# Patient Record
Sex: MALE | Race: WHITE | NOT HISPANIC OR LATINO
[De-identification: names, ages, dates, MRNs, and addresses within clinical notes are randomized per-mention and may not be internally consistent; named-entity substitution may affect disease eponyms.]

---

## 2018-05-08 PROBLEM — Z00.00 ENCOUNTER FOR PREVENTIVE HEALTH EXAMINATION: Status: ACTIVE | Noted: 2018-05-08

## 2023-01-20 ENCOUNTER — NON-APPOINTMENT (OUTPATIENT)
Age: 38
End: 2023-01-20

## 2023-08-02 ENCOUNTER — NON-APPOINTMENT (OUTPATIENT)
Age: 38
End: 2023-08-02

## 2023-08-10 ENCOUNTER — NON-APPOINTMENT (OUTPATIENT)
Age: 38
End: 2023-08-10

## 2023-08-11 ENCOUNTER — INPATIENT (INPATIENT)
Facility: HOSPITAL | Age: 38
LOS: 2 days | Discharge: HOME | DRG: 193 | End: 2023-08-14
Attending: STUDENT IN AN ORGANIZED HEALTH CARE EDUCATION/TRAINING PROGRAM | Admitting: HOSPITALIST
Payer: SELF-PAY

## 2023-08-11 VITALS
DIASTOLIC BLOOD PRESSURE: 87 MMHG | TEMPERATURE: 100 F | OXYGEN SATURATION: 90 % | WEIGHT: 220.02 LBS | HEIGHT: 71 IN | SYSTOLIC BLOOD PRESSURE: 141 MMHG | RESPIRATION RATE: 20 BRPM | HEART RATE: 127 BPM

## 2023-08-11 PROCEDURE — 71046 X-RAY EXAM CHEST 2 VIEWS: CPT | Mod: 26

## 2023-08-11 PROCEDURE — 99285 EMERGENCY DEPT VISIT HI MDM: CPT

## 2023-08-11 RX ORDER — SODIUM CHLORIDE 9 MG/ML
3 INJECTION INTRAMUSCULAR; INTRAVENOUS; SUBCUTANEOUS ONCE
Refills: 0 | Status: COMPLETED | OUTPATIENT
Start: 2023-08-11 | End: 2023-08-11

## 2023-08-11 RX ORDER — CEFTRIAXONE 500 MG/1
1000 INJECTION, POWDER, FOR SOLUTION INTRAMUSCULAR; INTRAVENOUS ONCE
Refills: 0 | Status: COMPLETED | OUTPATIENT
Start: 2023-08-11 | End: 2023-08-11

## 2023-08-11 RX ORDER — CEFTRIAXONE 500 MG/1
1000 INJECTION, POWDER, FOR SOLUTION INTRAMUSCULAR; INTRAVENOUS ONCE
Refills: 0 | Status: DISCONTINUED | OUTPATIENT
Start: 2023-08-11 | End: 2023-08-11

## 2023-08-11 RX ORDER — SODIUM CHLORIDE 9 MG/ML
1000 INJECTION INTRAMUSCULAR; INTRAVENOUS; SUBCUTANEOUS ONCE
Refills: 0 | Status: COMPLETED | OUTPATIENT
Start: 2023-08-11 | End: 2023-08-11

## 2023-08-11 RX ORDER — KETOROLAC TROMETHAMINE 30 MG/ML
30 SYRINGE (ML) INJECTION ONCE
Refills: 0 | Status: DISCONTINUED | OUTPATIENT
Start: 2023-08-11 | End: 2023-08-11

## 2023-08-11 RX ORDER — IPRATROPIUM/ALBUTEROL SULFATE 18-103MCG
3 AEROSOL WITH ADAPTER (GRAM) INHALATION ONCE
Refills: 0 | Status: COMPLETED | OUTPATIENT
Start: 2023-08-11 | End: 2023-08-11

## 2023-08-11 RX ORDER — AZITHROMYCIN 500 MG/1
500 TABLET, FILM COATED ORAL ONCE
Refills: 0 | Status: COMPLETED | OUTPATIENT
Start: 2023-08-11 | End: 2023-08-11

## 2023-08-11 RX ORDER — ACETAMINOPHEN 500 MG
975 TABLET ORAL ONCE
Refills: 0 | Status: COMPLETED | OUTPATIENT
Start: 2023-08-11 | End: 2023-08-12

## 2023-08-11 RX ORDER — ALBUTEROL 90 UG/1
2.5 AEROSOL, METERED ORAL ONCE
Refills: 0 | Status: COMPLETED | OUTPATIENT
Start: 2023-08-11 | End: 2023-08-11

## 2023-08-11 NOTE — ED ADULT TRIAGE NOTE - CHIEF COMPLAINT QUOTE
pt c/o not feeling well, was on Amoxicillin & prednisone, getting worse  A&Ox3, resp wnl, sent for r/o PNA, oxygen sat was 88%

## 2023-08-11 NOTE — ED PROVIDER NOTE - PROGRESS NOTE DETAILS
Pt remains hypoxic with R/A sat 89% despite nebs.  RVP as noted.  Case d/w Hospitalist and will admit for IV abx and supplemental oxygen

## 2023-08-11 NOTE — ED PROVIDER NOTE - CLINICAL SUMMARY MEDICAL DECISION MAKING FREE TEXT BOX
39 yo M presents to ED referred to ED from  for further evacuation of hypoxia with R/A O2 sat 88% and tachycardia to  135.  Pt states he was treated for bronchitis 1 week ago with Amoxicillin and po steroids, but never improved and now with worsening cough and fatigue.  pt denies any tob use or vaping, but does smoke marijuana.  will check labs, CXR, start IV Abx, nebs and re-eval 37 yo M presents to ED referred to ED from  for further evacuation of hypoxia with R/A O2 sat 88% and tachycardia to  135.  Pt states he was treated for bronchitis 1 week ago with Amoxicillin and po steroids, but never improved and now with worsening cough and fatigue.  pt denies any tob use or vaping, but does smoke marijuana.  will check labs, CXR, start IV Abx, nebs and re-eval

## 2023-08-11 NOTE — ED PROVIDER NOTE - OBJECTIVE STATEMENT
37 yo M presents to ED referred to ED from  for further evacuation of hypoxia with R/A O2 sat 88% and tachycardia to  135.  Pt states he was treated for bronchitis 1 week ago with Amoxicillin and po steroids, but never improved and now with worsening cough and fatigue.  pt denies any tob use or vaping, but does smoke marijuana

## 2023-08-12 DIAGNOSIS — J15.7 PNEUMONIA DUE TO MYCOPLASMA PNEUMONIAE: ICD-10-CM

## 2023-08-12 DIAGNOSIS — Z78.9 OTHER SPECIFIED HEALTH STATUS: Chronic | ICD-10-CM

## 2023-08-12 LAB
ALBUMIN SERPL ELPH-MCNC: 3.4 G/DL — SIGNIFICANT CHANGE UP (ref 3.3–5.2)
ALP SERPL-CCNC: 48 U/L — SIGNIFICANT CHANGE UP (ref 40–120)
ALT FLD-CCNC: 56 U/L — HIGH
ANION GAP SERPL CALC-SCNC: 15 MMOL/L — SIGNIFICANT CHANGE UP (ref 5–17)
ANION GAP SERPL CALC-SCNC: 17 MMOL/L — SIGNIFICANT CHANGE UP (ref 5–17)
AST SERPL-CCNC: 44 U/L — HIGH
B PERT DNA SPEC QL NAA+PROBE: DETECTED
BASE EXCESS BLDV CALC-SCNC: 1 MMOL/L — SIGNIFICANT CHANGE UP (ref -2–3)
BASOPHILS # BLD AUTO: 0.1 K/UL — SIGNIFICANT CHANGE UP (ref 0–0.2)
BASOPHILS NFR BLD AUTO: 0.8 % — SIGNIFICANT CHANGE UP (ref 0–2)
BILIRUB SERPL-MCNC: 0.4 MG/DL — SIGNIFICANT CHANGE UP (ref 0.4–2)
BUN SERPL-MCNC: 5.5 MG/DL — LOW (ref 8–20)
BUN SERPL-MCNC: 5.9 MG/DL — LOW (ref 8–20)
CA-I SERPL-SCNC: 1.03 MMOL/L — LOW (ref 1.15–1.33)
CALCIUM SERPL-MCNC: 7.6 MG/DL — LOW (ref 8.4–10.5)
CALCIUM SERPL-MCNC: 8.6 MG/DL — SIGNIFICANT CHANGE UP (ref 8.4–10.5)
CHLORIDE BLDV-SCNC: 96 MMOL/L — SIGNIFICANT CHANGE UP (ref 96–108)
CHLORIDE SERPL-SCNC: 101 MMOL/L — SIGNIFICANT CHANGE UP (ref 96–108)
CHLORIDE SERPL-SCNC: 91 MMOL/L — LOW (ref 96–108)
CO2 SERPL-SCNC: 21 MMOL/L — LOW (ref 22–29)
CO2 SERPL-SCNC: 23 MMOL/L — SIGNIFICANT CHANGE UP (ref 22–29)
CREAT SERPL-MCNC: 0.68 MG/DL — SIGNIFICANT CHANGE UP (ref 0.5–1.3)
CREAT SERPL-MCNC: 0.72 MG/DL — SIGNIFICANT CHANGE UP (ref 0.5–1.3)
EGFR: 120 ML/MIN/1.73M2 — SIGNIFICANT CHANGE UP
EGFR: 122 ML/MIN/1.73M2 — SIGNIFICANT CHANGE UP
EOSINOPHIL # BLD AUTO: 0.12 K/UL — SIGNIFICANT CHANGE UP (ref 0–0.5)
EOSINOPHIL NFR BLD AUTO: 1 % — SIGNIFICANT CHANGE UP (ref 0–6)
GAS PNL BLDV: 132 MMOL/L — LOW (ref 136–145)
GAS PNL BLDV: SIGNIFICANT CHANGE UP
GLUCOSE BLDV-MCNC: 113 MG/DL — HIGH (ref 70–99)
GLUCOSE SERPL-MCNC: 105 MG/DL — HIGH (ref 70–99)
GLUCOSE SERPL-MCNC: 115 MG/DL — HIGH (ref 70–99)
HCO3 BLDV-SCNC: 25 MMOL/L — SIGNIFICANT CHANGE UP (ref 22–29)
HCT VFR BLD CALC: 38.4 % — LOW (ref 39–50)
HCT VFR BLD CALC: 42 % — SIGNIFICANT CHANGE UP (ref 39–50)
HCT VFR BLDA CALC: 47 % — SIGNIFICANT CHANGE UP
HGB BLD CALC-MCNC: 15.6 G/DL — SIGNIFICANT CHANGE UP (ref 12.6–17.4)
HGB BLD-MCNC: 13.4 G/DL — SIGNIFICANT CHANGE UP (ref 13–17)
HGB BLD-MCNC: 15.1 G/DL — SIGNIFICANT CHANGE UP (ref 13–17)
IMM GRANULOCYTES NFR BLD AUTO: 2 % — HIGH (ref 0–0.9)
LACTATE BLDV-MCNC: 2.5 MMOL/L — HIGH (ref 0.5–2)
LYMPHOCYTES # BLD AUTO: 1.88 K/UL — SIGNIFICANT CHANGE UP (ref 1–3.3)
LYMPHOCYTES # BLD AUTO: 15 % — SIGNIFICANT CHANGE UP (ref 13–44)
MAGNESIUM SERPL-MCNC: 1.9 MG/DL — SIGNIFICANT CHANGE UP (ref 1.6–2.6)
MCHC RBC-ENTMCNC: 31.9 PG — SIGNIFICANT CHANGE UP (ref 27–34)
MCHC RBC-ENTMCNC: 32.5 PG — SIGNIFICANT CHANGE UP (ref 27–34)
MCHC RBC-ENTMCNC: 34.9 GM/DL — SIGNIFICANT CHANGE UP (ref 32–36)
MCHC RBC-ENTMCNC: 36 GM/DL — SIGNIFICANT CHANGE UP (ref 32–36)
MCV RBC AUTO: 90.3 FL — SIGNIFICANT CHANGE UP (ref 80–100)
MCV RBC AUTO: 91.4 FL — SIGNIFICANT CHANGE UP (ref 80–100)
MONOCYTES # BLD AUTO: 1.3 K/UL — HIGH (ref 0–0.9)
MONOCYTES NFR BLD AUTO: 10.4 % — SIGNIFICANT CHANGE UP (ref 2–14)
NEUTROPHILS # BLD AUTO: 8.85 K/UL — HIGH (ref 1.8–7.4)
NEUTROPHILS NFR BLD AUTO: 70.8 % — SIGNIFICANT CHANGE UP (ref 43–77)
PCO2 BLDV: 36 MMHG — LOW (ref 42–55)
PH BLDV: 7.45 — HIGH (ref 7.32–7.43)
PLATELET # BLD AUTO: 339 K/UL — SIGNIFICANT CHANGE UP (ref 150–400)
PLATELET # BLD AUTO: 393 K/UL — SIGNIFICANT CHANGE UP (ref 150–400)
PO2 BLDV: 71 MMHG — HIGH (ref 25–45)
POTASSIUM BLDV-SCNC: 3.4 MMOL/L — LOW (ref 3.5–5.1)
POTASSIUM SERPL-MCNC: 3.6 MMOL/L — SIGNIFICANT CHANGE UP (ref 3.5–5.3)
POTASSIUM SERPL-SCNC: 3.6 MMOL/L — SIGNIFICANT CHANGE UP (ref 3.5–5.3)
PROT SERPL-MCNC: 7.3 G/DL — SIGNIFICANT CHANGE UP (ref 6.6–8.7)
RAPID RVP RESULT: DETECTED
RBC # BLD: 4.2 M/UL — SIGNIFICANT CHANGE UP (ref 4.2–5.8)
RBC # BLD: 4.65 M/UL — SIGNIFICANT CHANGE UP (ref 4.2–5.8)
RBC # FLD: 12.1 % — SIGNIFICANT CHANGE UP (ref 10.3–14.5)
SAO2 % BLDV: 96.5 % — SIGNIFICANT CHANGE UP
SARS-COV-2 RNA SPEC QL NAA+PROBE: SIGNIFICANT CHANGE UP
SODIUM SERPL-SCNC: 131 MMOL/L — LOW (ref 135–145)
SODIUM SERPL-SCNC: 137 MMOL/L — SIGNIFICANT CHANGE UP (ref 135–145)
WBC # BLD: 12.5 K/UL — HIGH (ref 3.8–10.5)
WBC # BLD: 13.09 K/UL — HIGH (ref 3.8–10.5)
WBC # FLD AUTO: 12.5 K/UL — HIGH (ref 3.8–10.5)
WBC # FLD AUTO: 13.09 K/UL — HIGH (ref 3.8–10.5)

## 2023-08-12 PROCEDURE — 99223 1ST HOSP IP/OBS HIGH 75: CPT | Mod: GC

## 2023-08-12 RX ORDER — CEFTRIAXONE 500 MG/1
1000 INJECTION, POWDER, FOR SOLUTION INTRAMUSCULAR; INTRAVENOUS EVERY 24 HOURS
Refills: 0 | Status: DISCONTINUED | OUTPATIENT
Start: 2023-08-12 | End: 2023-08-14

## 2023-08-12 RX ORDER — AZITHROMYCIN 500 MG/1
500 TABLET, FILM COATED ORAL EVERY 24 HOURS
Refills: 0 | Status: DISCONTINUED | OUTPATIENT
Start: 2023-08-12 | End: 2023-08-14

## 2023-08-12 RX ORDER — LANOLIN ALCOHOL/MO/W.PET/CERES
3 CREAM (GRAM) TOPICAL AT BEDTIME
Refills: 0 | Status: DISCONTINUED | OUTPATIENT
Start: 2023-08-12 | End: 2023-08-14

## 2023-08-12 RX ORDER — THIAMINE MONONITRATE (VIT B1) 100 MG
100 TABLET ORAL DAILY
Refills: 0 | Status: COMPLETED | OUTPATIENT
Start: 2023-08-12 | End: 2023-08-14

## 2023-08-12 RX ORDER — SODIUM CHLORIDE 9 MG/ML
2000 INJECTION INTRAMUSCULAR; INTRAVENOUS; SUBCUTANEOUS ONCE
Refills: 0 | Status: COMPLETED | OUTPATIENT
Start: 2023-08-12 | End: 2023-08-12

## 2023-08-12 RX ORDER — ACETAMINOPHEN 500 MG
650 TABLET ORAL EVERY 6 HOURS
Refills: 0 | Status: DISCONTINUED | OUTPATIENT
Start: 2023-08-12 | End: 2023-08-14

## 2023-08-12 RX ORDER — FOLIC ACID 0.8 MG
1 TABLET ORAL DAILY
Refills: 0 | Status: DISCONTINUED | OUTPATIENT
Start: 2023-08-12 | End: 2023-08-14

## 2023-08-12 RX ORDER — ONDANSETRON 8 MG/1
4 TABLET, FILM COATED ORAL EVERY 8 HOURS
Refills: 0 | Status: DISCONTINUED | OUTPATIENT
Start: 2023-08-12 | End: 2023-08-14

## 2023-08-12 RX ADMIN — SODIUM CHLORIDE 3 MILLILITER(S): 9 INJECTION INTRAMUSCULAR; INTRAVENOUS; SUBCUTANEOUS at 00:14

## 2023-08-12 RX ADMIN — Medication 975 MILLIGRAM(S): at 03:12

## 2023-08-12 RX ADMIN — Medication 3 MILLILITER(S): at 00:07

## 2023-08-12 RX ADMIN — Medication 1 MILLIGRAM(S): at 09:53

## 2023-08-12 RX ADMIN — AZITHROMYCIN 255 MILLIGRAM(S): 500 TABLET, FILM COATED ORAL at 00:06

## 2023-08-12 RX ADMIN — AZITHROMYCIN 255 MILLIGRAM(S): 500 TABLET, FILM COATED ORAL at 05:43

## 2023-08-12 RX ADMIN — CEFTRIAXONE 1000 MILLIGRAM(S): 500 INJECTION, POWDER, FOR SOLUTION INTRAMUSCULAR; INTRAVENOUS at 05:43

## 2023-08-12 RX ADMIN — Medication 100 MILLIGRAM(S): at 09:54

## 2023-08-12 RX ADMIN — ALBUTEROL 2.5 MILLIGRAM(S): 90 AEROSOL, METERED ORAL at 00:07

## 2023-08-12 RX ADMIN — SODIUM CHLORIDE 1000 MILLILITER(S): 9 INJECTION INTRAMUSCULAR; INTRAVENOUS; SUBCUTANEOUS at 02:34

## 2023-08-12 RX ADMIN — Medication 1 TABLET(S): at 09:54

## 2023-08-12 RX ADMIN — CEFTRIAXONE 1000 MILLIGRAM(S): 500 INJECTION, POWDER, FOR SOLUTION INTRAMUSCULAR; INTRAVENOUS at 00:06

## 2023-08-12 RX ADMIN — SODIUM CHLORIDE 1000 MILLILITER(S): 9 INJECTION INTRAMUSCULAR; INTRAVENOUS; SUBCUTANEOUS at 00:14

## 2023-08-12 NOTE — H&P ADULT - ASSESSMENT
38M no PMHx presents to the ED after being referred from urgent care for evaluation of hypoxia. Also had cough, chest congestion, and SOB Completed a course of augmentin and steroids however symtoms have persisted. CXR shows b/l hilar opacities     Community Acquired Pneumonia  - Admit to   - RVP shows Mycoplasma  - Start Azithromycin 500 mg IV daily  - Start Ceftriaxone 1g IV daily  - Trend WBC  - Tylenol PRN for fever  - F/u Bcx    VTE ppx:  38M no PMHx presents to the ED after being referred from urgent care for evaluation of hypoxia. Also had cough, chest congestion, and SOB Completed a course of augmentin and steroids however symtoms have persisted. CXR shows b/l hilar opacities     Community Acquired Pneumonia  - Admit to   - RVP shows Mycoplasma  - Start Azithromycin 500 mg IV daily  - Start Ceftriaxone 1g IV daily  - Trend WBC  - Tylenol PRN for fever  - F/u Bcx    Alcohol Use  - CIWA protocol  -  for alcohol cessation  - Ativan if CIWA >8    VTE ppx: Ambulate as tolerated 38M no PMHx presents to the ED after being referred from urgent care for evaluation of hypoxia. Also had cough, chest congestion, and SOB Completed a course of augmentin and steroids however symtoms have persisted. CXR shows b/l hilar opacities     Community Acquired Pneumonia  - Admit to   - RVP shows Mycoplasma  - Start Azithromycin 500 mg IV daily  - Start Ceftriaxone 1g IV daily  - Trend WBC  - Tylenol PRN for fever  - F/u Bcx    Alcohol Use  - CIWA protocol  -  for alcohol cessation  - Ativan if CIWA >8  - PO Folate, Thiamine, Multivitamins    VTE ppx: Ambulate as tolerated 38M no PMHx presents to the ED after being referred from urgent care for evaluation of hypoxia. Also had cough, chest congestion, and SOB Completed a course of augmentin and steroids however symtoms have persisted. CXR shows b/l hilar opacities     Community Acquired Pneumonia  - Admit to   - RVP shows Mycoplasma  - Start Azithromycin 500 mg IV daily  - Start Ceftriaxone 1g IV daily  - Trend WBC  - Tylenol PRN for fever  - F/u Bcx    Alcohol Use  - [facility] protocol  -  for alcohol cessation  - Ativan symptom triggered   - PO Folate, Thiamine, Multivitamins    VTE ppx: Ambulate as tolerated 38M no PMHx presents to the ED after being referred from urgent care for evaluation of hypoxia. Also had cough, chest congestion, and SOB Completed a course of augmentin and steroids however symtoms have persisted. CXR shows b/l hilar opacities     Community Acquired Pneumonia  - Admit to   - RVP shows Mycoplasma  - Start Azithromycin 500 mg IV daily  - Start Ceftriaxone 1g IV daily  - Trend WBC  - Tylenol PRN for fever  - F/u Bcx    Alcohol Use  - [facility] protocol  -  for alcohol cessation  - Ativan symptom triggered   - PO Folate, Thiamine, Multivitamins    VTE ppx: Ambulate as tolerated 38M no PMHx presents to the ED after being referred from urgent care for evaluation of hypoxia. Also had cough, chest congestion, and SOB Completed a course of augmentin and steroids however symtoms have persisted. CXR shows b/l hilar opacities     Community Acquired Pneumonia  - Admit to   - RVP shows Mycoplasma  - Start Azithromycin 500 mg IV daily  - Start Ceftriaxone 1g IV daily  - Trend WBC  - Tylenol PRN for fever  - F/u Bcx    Alcohol Use  - [facility] protocol  -  for alcohol cessation  - Ativan symptom triggered   - PO Folate, Thiamine, Multivitamins    VTE ppx: Ambulate as tolerated

## 2023-08-12 NOTE — H&P ADULT - ATTENDING COMMENTS
37yo M with PMHx of Etoh abuse presented to ED from urgent care for hypoxia. Pt and girlfriend at bedside reports that pt has been having cough, congestion for the past 2 weeks, He was treated with Augmentin and prednisone without any relief. Pt's girlfriend reports to sob and subjective fever they went back to urgent care and was hypoxic sent to ED. Denies cp, palpitations, n/v, abdominal pain. As per girlfriend pt drink alcohol daily 2-3drinks and more at times. In the ED pt required 2L NC, RVP with mycoplasma pneumonia, afebrile with leukocytosis fo 12k. Pt was given Ceftriaxone and Zithromax.  I agree with plan as outlined above, made amendment when needed. Plan discussed with resident Dr. Driscoll. 39yo M with PMHx of Etoh abuse presented to ED from urgent care for hypoxia. Pt and girlfriend at bedside reports that pt has been having cough, congestion for the past 2 weeks, He was treated with Augmentin and prednisone without any relief. Pt's girlfriend reports to sob and subjective fever they went back to urgent care and was hypoxic sent to ED. Denies cp, palpitations, n/v, abdominal pain. As per girlfriend pt drink alcohol daily 2-3drinks and more at times. In the ED pt required 2L NC, RVP with mycoplasma pneumonia, afebrile with leukocytosis fo 12k. Pt was given Ceftriaxone and Zithromax.  I agree with plan as outlined above, made amendment when needed. Plan discussed with resident Dr. Driscoll.

## 2023-08-12 NOTE — H&P ADULT - NSICDXPASTMEDICALHX_GEN_ALL_CORE_FT
PAST MEDICAL HISTORY:  No pertinent past medical history      PAST MEDICAL HISTORY:  Alcohol abuse

## 2023-08-12 NOTE — PATIENT PROFILE ADULT - FALL HARM RISK - UNIVERSAL INTERVENTIONS
Bed in lowest position, wheels locked, appropriate side rails in place/Call bell, personal items and telephone in reach/Instruct patient to call for assistance before getting out of bed or chair/Non-slip footwear when patient is out of bed/[place] to call system/Physically safe environment - no spills, clutter or unnecessary equipment/Purposeful Proactive Rounding/Room/bathroom lighting operational, light cord in reach Bed in lowest position, wheels locked, appropriate side rails in place/Call bell, personal items and telephone in reach/Instruct patient to call for assistance before getting out of bed or chair/Non-slip footwear when patient is out of bed/[place] to call system/Physically safe environment - no spills, clutter or unnecessary equipment/Purposeful Proactive Rounding/Room/bathroom lighting operational, light cord in reach Bed in lowest position, wheels locked, appropriate side rails in place/Call bell, personal items and telephone in reach/Instruct patient to call for assistance before getting out of bed or chair/Non-slip footwear when patient is out of bed/[place] to call system/Physically safe environment - no spills, clutter or unnecessary equipment/Purposeful Proactive Rounding/Room/bathroom lighting operational, light cord in reach

## 2023-08-12 NOTE — CHART NOTE - NSCHARTNOTEFT_GEN_A_CORE
Pt is a 37yo M admitted to [facility] for mycoplasma pna.   Pt wsae seen and examined at bedside. Complaints of bilateral ear pain and continuously coughing.     Examination showed right otitis media and left ear cerumen impaction.     Pt is already on Rocephin and Azithromycin which cover right OM.     Hycodan as needed for cough     rest plan agree with H&P Pt is a 39yo M admitted to [facility] for mycoplasma pna.   Pt wsae seen and examined at bedside. Complaints of bilateral ear pain and continuously coughing.     Examination showed right otitis media and left ear cerumen impaction.     Pt is already on Rocephin and Azithromycin which cover right OM.     Hycodan as needed for cough     rest plan agree with H&P Pt is a 39yo M admitted to [facility] for mycoplasma pna.   Pt wsae seen and examined at bedside. Complaints of bilateral ear pain and continuously coughing.     Examination showed right otitis media and left ear cerumen impaction.     Pt is already on Rocephin and Azithromycin which cover right OM.     Hycodan as needed for cough     rest plan agree with H&P

## 2023-08-12 NOTE — H&P ADULT - HISTORY OF PRESENT ILLNESS
38M no PMHx presents to the ED after being referred from urgent care for evaluation of hypoxia with Sp2 88.  Patient states 2 weeks back he developed a cough. Over the next one week he developed shortness of breath, headache, chest congestion. He went to urgent care where he was prescribed amoxicillin and steroids. However his symptoms have worsened with fatigue and ear congestion. He reports having fever throughout the week but never checked it. Denies chest pain, palpitations, abdominal pain, nausea, vomiting. 38M no PMHx presents to the ED after being referred from urgent care for evaluation of hypoxia with SpO2 88.  Patient states 2 weeks back he developed a cough. Over the next one week he developed shortness of breath, headache, chest congestion. He went to urgent care where he was prescribed amoxicillin and steroids. However his symptoms have worsened with fatigue, wheezing, sputum production and ear congestion. He reports having fever throughout the week but never checked it. Denies nasal congestion, sore throat, chest pain, palpitations, abdominal pain, nausea, vomiting.

## 2023-08-12 NOTE — H&P ADULT - NSHPLABSRESULTS_GEN_ALL_CORE
CBC:            15.1   12.50 )-----------( [id number]      ( 08-12-23 @ 00:00 )             42.0         Chem:         ( 08-12-23 @ 00:00 )    131<L>  |  91<L>  |  5.5<L>  ----------------------------<  105<H>  3.6   |  23.0  |  0.72        Liver Functions: ( 08-12-23 @ 00:00 )  Alb: 3.4 g/dL / Pro: 7.3 g/dL / ALK PHOS: 48 U/L / ALT: 56 U/L / AST: 44 U/L / GGT: x              Type & Screen:

## 2023-08-12 NOTE — ED ADULT NURSE NOTE - OBJECTIVE STATEMENT
Assumed care of pt at 2300 Pt A&Ox4 came in from  after having increasing SOB and GOEL. Pt was given prednisone and ATB last week and states there was no relief. Pt feels congested and stuffy, having ringing in ears and congestion, and intermittent cough. Abd soft nontender, denies n/v/d, denies CP. Complains of occasional intercostal pain while coughing. Pt denies having any sick contacts.

## 2023-08-12 NOTE — H&P ADULT - NSHPPHYSICALEXAM_GEN_ALL_CORE
Vital Signs Last 24 Hrs  T(C): 37.1 (12 Aug 2023 03:40), Max: 37.9 (11 Aug 2023 21:39)  T(F): 98.8 (12 Aug 2023 03:40), Max: 100.3 (11 Aug 2023 21:39)  HR: 108 (12 Aug 2023 03:40) (89 - 127)  BP: 161/97 (12 Aug 2023 03:40) (132/82 - 161/97)  BP(mean): --  RR: 18 (12 Aug 2023 03:15) (18 - 20)  SpO2: 95% (12 Aug 2023 03:15) (90% - 95%)    Parameters below as of 12 Aug 2023 03:15  Patient On (Oxygen Delivery Method): nasal cannula  O2 Flow (L/min): 1.5  O2 Concentration (%): 93    CONSTITUTIONAL: Well appearing, well nourished, awake, alert, oriented to person, place, time/situation and in no apparent distress.  EYES: PERRLA, EOMI, Sclera and conjunctiva clear  ENT: Oral mucosa moist, airway patent, good dentition, uvula midline, on nasal cannula  CARDIAC: Normal rate, regular rhythm.  Heart sounds S1, S2.  No murmurs, rubs or gallops.  RESPIRATORY: Scattered rhonchi and wheezes b/l  GASTROINTESTINAL: Abdomen soft, non-tender, no guarding.  MUSCULOSKELETAL: Spine appears normal, range of motion is not limited, no muscle or joint tenderness  NEUROLOGICAL: Alert and oriented, no focal deficits, no motor or sensory deficits.  SKIN: Skin normal color, warm, dry and intact. No evidence of rash. Vital Signs Last 24 Hrs  T(C): 37.1 (12 Aug 2023 03:40), Max: 37.9 (11 Aug 2023 21:39)  T(F): 98.8 (12 Aug 2023 03:40), Max: 100.3 (11 Aug 2023 21:39)  HR: 108 (12 Aug 2023 03:40) (89 - 127)  BP: 161/97 (12 Aug 2023 03:40) (132/82 - 161/97)  BP(mean): --  RR: 18 (12 Aug 2023 03:15) (18 - 20)  SpO2: 95% (12 Aug 2023 03:15) (90% - 95%)    Parameters below as of 12 Aug 2023 03:15  Patient On (Oxygen Delivery Method): nasal cannula  O2 Flow (L/min): 1.5  O2 Concentration (%): 93    CONSTITUTIONAL: Well appearing, well nourished, awake, alert, oriented to person, place, time/situation and in no apparent distress.  EYES: PERRLA, EOMI, Sclera and conjunctiva clear  ENT: Oral mucosa moist, airway patent, good dentition, uvula midline, on nasal cannula, fluid in right ear  CARDIAC: Normal rate, regular rhythm.  Heart sounds S1, S2.  No murmurs, rubs or gallops.  RESPIRATORY: Scattered rhonchi and wheezes b/l  GASTROINTESTINAL: Abdomen soft, non-tender, no guarding.  MUSCULOSKELETAL: Spine appears normal, range of motion is not limited, no muscle or joint tenderness  NEUROLOGICAL: Alert and oriented, no focal deficits, no motor or sensory deficits.  SKIN: Skin normal color, warm, dry and intact. No evidence of rash.

## 2023-08-12 NOTE — H&P ADULT - NSHPSOCIALHISTORY_GEN_ALL_CORE
Works as   Never smoker  Has 1-2 drinks everyday Works as   Never smoker  Has 1-2 drinks everyday  Girlfriend present at bedside states he drinks more than that

## 2023-08-13 LAB
ANION GAP SERPL CALC-SCNC: 12 MMOL/L — SIGNIFICANT CHANGE UP (ref 5–17)
BUN SERPL-MCNC: 3.9 MG/DL — LOW (ref 8–20)
CALCIUM SERPL-MCNC: 8.3 MG/DL — LOW (ref 8.4–10.5)
CHLORIDE SERPL-SCNC: 100 MMOL/L — SIGNIFICANT CHANGE UP (ref 96–108)
CO2 SERPL-SCNC: 25 MMOL/L — SIGNIFICANT CHANGE UP (ref 22–29)
CREAT SERPL-MCNC: 0.73 MG/DL — SIGNIFICANT CHANGE UP (ref 0.5–1.3)
EGFR: 119 ML/MIN/1.73M2 — SIGNIFICANT CHANGE UP
GLUCOSE SERPL-MCNC: 106 MG/DL — HIGH (ref 70–99)
HCT VFR BLD CALC: 41.2 % — SIGNIFICANT CHANGE UP (ref 39–50)
HGB BLD-MCNC: 14.3 G/DL — SIGNIFICANT CHANGE UP (ref 13–17)
MCHC RBC-ENTMCNC: 32.3 PG — SIGNIFICANT CHANGE UP (ref 27–34)
MCHC RBC-ENTMCNC: 34.7 GM/DL — SIGNIFICANT CHANGE UP (ref 32–36)
MCV RBC AUTO: 93 FL — SIGNIFICANT CHANGE UP (ref 80–100)
PLATELET # BLD AUTO: 369 K/UL — SIGNIFICANT CHANGE UP (ref 150–400)
POTASSIUM SERPL-MCNC: 3.9 MMOL/L — SIGNIFICANT CHANGE UP (ref 3.5–5.3)
POTASSIUM SERPL-SCNC: 3.9 MMOL/L — SIGNIFICANT CHANGE UP (ref 3.5–5.3)
RBC # BLD: 4.43 M/UL — SIGNIFICANT CHANGE UP (ref 4.2–5.8)
RBC # FLD: 12 % — SIGNIFICANT CHANGE UP (ref 10.3–14.5)
SODIUM SERPL-SCNC: 137 MMOL/L — SIGNIFICANT CHANGE UP (ref 135–145)
WBC # BLD: 9.11 K/UL — SIGNIFICANT CHANGE UP (ref 3.8–10.5)
WBC # FLD AUTO: 9.11 K/UL — SIGNIFICANT CHANGE UP (ref 3.8–10.5)

## 2023-08-13 PROCEDURE — 99233 SBSQ HOSP IP/OBS HIGH 50: CPT

## 2023-08-13 RX ORDER — FLUTICASONE PROPIONATE 50 MCG
1 SPRAY, SUSPENSION NASAL
Refills: 0 | Status: DISCONTINUED | OUTPATIENT
Start: 2023-08-13 | End: 2023-08-14

## 2023-08-13 RX ADMIN — Medication 100 MILLIGRAM(S): at 13:28

## 2023-08-13 RX ADMIN — Medication 1 SPRAY(S): at 13:30

## 2023-08-13 RX ADMIN — AZITHROMYCIN 255 MILLIGRAM(S): 500 TABLET, FILM COATED ORAL at 05:09

## 2023-08-13 RX ADMIN — Medication 1 TABLET(S): at 13:28

## 2023-08-13 RX ADMIN — CEFTRIAXONE 1000 MILLIGRAM(S): 500 INJECTION, POWDER, FOR SOLUTION INTRAMUSCULAR; INTRAVENOUS at 05:09

## 2023-08-13 RX ADMIN — Medication 1 MILLIGRAM(S): at 13:28

## 2023-08-13 NOTE — PROGRESS NOTE ADULT - SUBJECTIVE AND OBJECTIVE BOX
Hospitalist Progress Note    Chief Complaint:  SOB    SUBJECTIVE / OVERNIGHT EVENTS:  No events overnight, patient seen at bedside, in NAD, complaining of hearing difficulty, shortness of breath. Patient denies chest pain, abd pain, N/V, fever, chills, dysuria or any other complaints. All remainder ROS negative.     MEDICATIONS  (STANDING):  azithromycin  IVPB 500 milliGRAM(s) IV Intermittent every 24 hours  cefTRIAXone Injectable. 1000 milliGRAM(s) IV Push every 24 hours  folic acid 1 milliGRAM(s) Oral daily  multivitamin 1 Tablet(s) Oral daily  thiamine 100 milliGRAM(s) Oral daily    MEDICATIONS  (PRN):  acetaminophen     Tablet .. 650 milliGRAM(s) Oral every 6 hours PRN Temp greater or equal to 38C (100.4F), Mild Pain (1 - 3)  aluminum hydroxide/magnesium hydroxide/simethicone Suspension 30 milliLiter(s) Oral every 4 hours PRN Dyspepsia  hydrocodone/homatropine Syrup 5 milliLiter(s) Oral every 6 hours PRN Cough  LORazepam   Injectable 2 milliGRAM(s) IV Push every 1 hour PRN Symptom-triggered: each CIWA -Ar score 8 or GREATER  melatonin 3 milliGRAM(s) Oral at bedtime PRN Insomnia  ondansetron Injectable 4 milliGRAM(s) IV Push every 8 hours PRN Nausea and/or Vomiting        I&O's Summary      PHYSICAL EXAM:  Vital Signs Last 24 Hrs  T(C): 37 (13 Aug 2023 07:26), Max: 37.2 (12 Aug 2023 17:44)  T(F): 98.6 (13 Aug 2023 07:26), Max: 98.9 (12 Aug 2023 17:44)  HR: 100 (13 Aug 2023 07:26) (95 - 119)  BP: 136/98 (13 Aug 2023 07:26) (136/98 - 168/93)  BP(mean): 112 (12 Aug 2023 17:44) (112 - 112)  RR: 22 (13 Aug 2023 10:00) (18 - 22)  SpO2: 92% (13 Aug 2023 10:00) (91% - 95%)    Parameters below as of 13 Aug 2023 10:00  Patient On (Oxygen Delivery Method): room air      CONSTITUTIONAL: Well appearing, well nourished, awake, alert, oriented to person, place, time/situation and in no apparent distress.  EYES: PERRLA, EOMI, Sclera and conjunctiva clear  ENT: Oral mucosa moist, airway patent, good dentition, uvula midline, on nasal cannula, fluid in right ear  CARDIAC: Normal rate, regular rhythm.  Heart sounds S1, S2.  No murmurs, rubs or gallops.  RESPIRATORY: BLAE, mild rhonci  GASTROINTESTINAL: Abdomen soft, non-tender, no guarding.  MUSCULOSKELETAL: Spine appears normal, range of motion is not limited, no muscle or joint tenderness  NEUROLOGICAL: Alert and oriented, no focal deficits, no motor or sensory deficits.  SKIN: Skin normal color, warm, dry and intact. No evidence of rash.    LABS:                        14.3   9.11  )-----------( 369      ( 13 Aug 2023 07:12 )             41.2     08-13    137  |  100  |  3.9<L>  ----------------------------<  106<H>  3.9   |  25.0  |  0.73    Ca    8.3<L>      13 Aug 2023 07:12  Mg     1.9     08-12    TPro  7.3  /  Alb  3.4  /  TBili  0.4  /  DBili  x   /  AST  44<H>  /  ALT  56<H>  /  AlkPhos  48  08-12          Urinalysis Basic - ( 13 Aug 2023 07:12 )    Color: x / Appearance: x / SG: x / pH: x  Gluc: 106 mg/dL / Ketone: x  / Bili: x / Urobili: x   Blood: x / Protein: x / Nitrite: x   Leuk Esterase: x / RBC: x / WBC x   Sq Epi: x / Non Sq Epi: x / Bacteria: x        Culture - Blood (collected 12 Aug 2023 00:00)  Source: .Blood Blood-Peripheral  Preliminary Report (13 Aug 2023 07:01):    No growth at 24 hours    Culture - Blood (collected 12 Aug 2023 00:00)  Source: .Blood Blood-Peripheral  Preliminary Report (13 Aug 2023 07:01):    No growth at 24 hours      CAPILLARY BLOOD GLUCOSE            RADIOLOGY & ADDITIONAL TESTS:  Results Reviewed: Y  Imaging Personally Reviewed: N  Electrocardiogram Personally Reviewed: N

## 2023-08-13 NOTE — PROGRESS NOTE ADULT - ASSESSMENT
38M no PMHx presents to the ED after being referred from urgent care for evaluation of hypoxia. Also had cough, chest congestion, and SOB Completed a course of augmentin and steroids however symtoms have persisted. CXR shows b/l hilar opacities     Community Acquired Pneumonia 2/2 Mycoplasma  - Azithromycin 500 mg IV daily  - Ceftriaxone 1g IV daily  - Trend WBC  - Tylenol PRN for fever  - F/u Bcx  - complaining of hearing loss likely 2/2 effusion, start flonase q12    Alcohol Use  - [facility] protocol  -  for alcohol cessation  - Ativan symptom triggered   - PO Folate, Thiamine, Multivitamins    VTE ppx: Ambulate as tolerated 38M no PMHx presents to the ED after being referred from urgent care for evaluation of hypoxia. Also had cough, chest congestion, and SOB Completed a course of augmentin and steroids however symtoms have persisted. CXR shows b/l hilar opacities     Community Acquired Pneumonia 2/2 Mycoplasma  - Azithromycin 500 mg IV daily  - Ceftriaxone 1g IV daily  - Trend WBC  - Tylenol PRN for fever  - F/u Bcx  - complaining of hearing loss likely 2/2 effusion, start flonase q12    Alcohol Use  - [facility] protocol  -  for alcohol cessation  - Ativan symptom triggered   - PO Folate, Thiamine, Multivitamins    VTE ppx: Ambulate as tolerated 38M no PMHx presents to the ED after being referred from urgent care for evaluation of hypoxia. Also had cough, chest congestion, and SOB Completed a course of augmentin and steroids however symtoms have persisted. CXR shows b/l hilar opacities     Community Acquired Pneumonia 2/2 Mycoplasma  - Azithromycin 500 mg IV daily  - Ceftriaxone 1g IV daily  - Trend WBC  - Tylenol PRN for fever  - F/u Bcx  - complaining of hearing loss likely 2/2 effusion, start flonase q12    Alcohol Use  - [facility] protocol  -  for alcohol cessation  - Ativan symptom triggered   - PO Folate, Thiamine, Multivitamins    VTE ppx: Ambulate as tolerated

## 2023-08-14 ENCOUNTER — TRANSCRIPTION ENCOUNTER (OUTPATIENT)
Age: 38
End: 2023-08-14

## 2023-08-14 VITALS
HEART RATE: 98 BPM | SYSTOLIC BLOOD PRESSURE: 123 MMHG | RESPIRATION RATE: 16 BRPM | DIASTOLIC BLOOD PRESSURE: 89 MMHG | OXYGEN SATURATION: 95 % | TEMPERATURE: 98 F

## 2023-08-14 PROCEDURE — 85025 COMPLETE CBC W/AUTO DIFF WBC: CPT

## 2023-08-14 PROCEDURE — 84132 ASSAY OF SERUM POTASSIUM: CPT

## 2023-08-14 PROCEDURE — 99285 EMERGENCY DEPT VISIT HI MDM: CPT

## 2023-08-14 PROCEDURE — 87040 BLOOD CULTURE FOR BACTERIA: CPT

## 2023-08-14 PROCEDURE — 85018 HEMOGLOBIN: CPT

## 2023-08-14 PROCEDURE — 83735 ASSAY OF MAGNESIUM: CPT

## 2023-08-14 PROCEDURE — 80048 BASIC METABOLIC PNL TOTAL CA: CPT

## 2023-08-14 PROCEDURE — 84295 ASSAY OF SERUM SODIUM: CPT

## 2023-08-14 PROCEDURE — 80053 COMPREHEN METABOLIC PANEL: CPT

## 2023-08-14 PROCEDURE — 94640 AIRWAY INHALATION TREATMENT: CPT

## 2023-08-14 PROCEDURE — 83605 ASSAY OF LACTIC ACID: CPT

## 2023-08-14 PROCEDURE — 96375 TX/PRO/DX INJ NEW DRUG ADDON: CPT

## 2023-08-14 PROCEDURE — 85027 COMPLETE CBC AUTOMATED: CPT

## 2023-08-14 PROCEDURE — 99239 HOSP IP/OBS DSCHRG MGMT >30: CPT

## 2023-08-14 PROCEDURE — 82947 ASSAY GLUCOSE BLOOD QUANT: CPT

## 2023-08-14 PROCEDURE — 82435 ASSAY OF BLOOD CHLORIDE: CPT

## 2023-08-14 PROCEDURE — 36415 COLL VENOUS BLD VENIPUNCTURE: CPT

## 2023-08-14 PROCEDURE — 85014 HEMATOCRIT: CPT

## 2023-08-14 PROCEDURE — 82330 ASSAY OF CALCIUM: CPT

## 2023-08-14 PROCEDURE — 96374 THER/PROPH/DIAG INJ IV PUSH: CPT

## 2023-08-14 PROCEDURE — 82803 BLOOD GASES ANY COMBINATION: CPT

## 2023-08-14 PROCEDURE — 71046 X-RAY EXAM CHEST 2 VIEWS: CPT

## 2023-08-14 PROCEDURE — 0225U NFCT DS DNA&RNA 21 SARSCOV2: CPT

## 2023-08-14 RX ORDER — HYDROCODONE BITARTRATE AND HOMATROPINE METHYLBROMIDE 5; 1.5 MG/5ML; MG/5ML
5 SOLUTION ORAL
Qty: 100 | Refills: 0
Start: 2023-08-14 | End: 2023-08-19

## 2023-08-14 RX ORDER — PSEUDOEPHEDRINE HCL 30 MG
60 TABLET ORAL EVERY 6 HOURS
Refills: 0 | Status: DISCONTINUED | OUTPATIENT
Start: 2023-08-14 | End: 2023-08-14

## 2023-08-14 RX ORDER — ALBUTEROL 90 UG/1
2 AEROSOL, METERED ORAL
Qty: 1 | Refills: 0
Start: 2023-08-14 | End: 2023-09-12

## 2023-08-14 RX ORDER — ALBUTEROL 90 UG/1
2 AEROSOL, METERED ORAL EVERY 6 HOURS
Refills: 0 | Status: DISCONTINUED | OUTPATIENT
Start: 2023-08-14 | End: 2023-08-14

## 2023-08-14 RX ORDER — FLUTICASONE PROPIONATE 50 MCG
1 SPRAY, SUSPENSION NASAL
Qty: 1 | Refills: 0
Start: 2023-08-14 | End: 2023-09-12

## 2023-08-14 RX ORDER — PSEUDOEPHEDRINE HCL 30 MG
2 TABLET ORAL
Qty: 0 | Refills: 0 | DISCHARGE
Start: 2023-08-14

## 2023-08-14 RX ADMIN — CEFTRIAXONE 1000 MILLIGRAM(S): 500 INJECTION, POWDER, FOR SOLUTION INTRAMUSCULAR; INTRAVENOUS at 05:22

## 2023-08-14 RX ADMIN — Medication 1 TABLET(S): at 09:48

## 2023-08-14 RX ADMIN — Medication 40 MILLIGRAM(S): at 13:47

## 2023-08-14 RX ADMIN — Medication 100 MILLIGRAM(S): at 09:48

## 2023-08-14 RX ADMIN — AZITHROMYCIN 255 MILLIGRAM(S): 500 TABLET, FILM COATED ORAL at 05:22

## 2023-08-14 RX ADMIN — Medication 1 MILLIGRAM(S): at 09:48

## 2023-08-14 RX ADMIN — Medication 1 SPRAY(S): at 09:48

## 2023-08-14 RX ADMIN — Medication 100 MILLIGRAM(S): at 13:48

## 2023-08-14 NOTE — DISCHARGE NOTE NURSING/CASE MANAGEMENT/SOCIAL WORK - PATIENT PORTAL LINK FT
You can access the FollowMyHealth Patient Portal offered by [facility] by registering at the following website: [email or web address]. By joining [employer]’s FollowMyHealth portal, you will also be able to view your health information using other applications (apps) compatible with our system. You can access the FollowMyHealth Patient Portal offered by [facility] by registering at the following website: [email or web address]. By joining [employer]’s FollowMyHealth portal, you will also be able to view your health information using other applications (apps) compatible with our system. You can access the FollowMyHealth Patient Portal offered by [facility] by registering at the following website: [email or web address]. By joining [employer]’s FollowMyHealth portal, you will also be able to view your health information using other applications (apps) compatible with our system.

## 2023-08-14 NOTE — DISCHARGE NOTE PROVIDER - NSDCCPCAREPLAN_GEN_ALL_CORE_FT
PRINCIPAL DISCHARGE DIAGNOSIS  Diagnosis: Mycoplasma pneumonia  Assessment and Plan of Treatment: Continue with doxycycline for additional 7 days. Take all meds as prescribed, hearing and congestion will return with time. Mycotic Pneumonia takes a long time to heal however with this medication reigmen and adequate rest at home, you'll be back on your feet soon      SECONDARY DISCHARGE DIAGNOSES  Diagnosis: Hypoxic  Assessment and Plan of Treatment: resolved

## 2023-08-14 NOTE — DISCHARGE NOTE PROVIDER - ATTENDING DISCHARGE PHYSICAL EXAMINATION:
CONSTITUTIONAL: Well appearing, well nourished, awake, alert, oriented to person, place, time/situation and in no apparent distress.  EYES: PERRLA, EOMI, Sclera and conjunctiva clear  ENT: Oral mucosa moist, airway patent, good dentition, uvula midline, on nasal cannula, fluid in right ear  CARDIAC: Normal rate, regular rhythm.  Heart sounds S1, S2.  No murmurs, rubs or gallops.  RESPIRATORY: Scattered rhonchi and wheezes b/l  GASTROINTESTINAL: Abdomen soft, non-tender, no guarding.  MUSCULOSKELETAL: Spine appears normal, range of motion is not limited, no muscle or joint tenderness  NEUROLOGICAL: Alert and oriented, no focal deficits, no motor or sensory deficits.  SKIN: Skin normal color, warm, dry and intact. No evidence of rash.

## 2023-08-14 NOTE — DISCHARGE NOTE PROVIDER - NSDCMRMEDTOKEN_GEN_ALL_CORE_FT
albuterol 90 mcg/inh inhalation aerosol: 2 puff(s) inhaled every 6 hours as needed for Shortness of Breath and/or Wheezing  benzonatate 100 mg oral capsule: 1 orally 3 times a day as needed for  cough  doxycycline monohydrate 50 mg oral capsule: 2 cap(s) orally every 12 hours  fluticasone 50 mcg/inh nasal spray: 1 spray(s) nasal 2 times a day  hydrocodone-homatropine 5 mg-1.5 mg/5 mL oral syrup: 5 milliliter(s) orally every 6 hours as needed for Cough MDD: 20ml  predniSONE 20 mg oral tablet: 2 tab(s) orally once a day  pseudoephedrine 30 mg oral tablet: 2 tab(s) orally every 6 hours As needed congestion

## 2023-08-14 NOTE — DISCHARGE NOTE PROVIDER - HOSPITAL COURSE
37 y/o M here for acute hypoxic respiratory failure 2/2 Mycobacterial PNA. Started on Azithromycin and ceftriaxone, patient clinically improved, no longer requiring oxygen, will require decongestants, flonase and albuterol for up to 2 additional weeks. Completed azithromycin, will send home w/ doxycycline for total of 10 days of Abx. Must follow up with PCP outpatient 39 y/o M here for acute hypoxic respiratory failure 2/2 Mycobacterial PNA. Started on Azithromycin and ceftriaxone, patient clinically improved, no longer requiring oxygen, will require decongestants, flonase and albuterol for up to 2 additional weeks. Completed azithromycin, will send home w/ doxycycline for total of 10 days of Abx. Must follow up with PCP outpatient

## 2023-08-14 NOTE — DISCHARGE NOTE NURSING/CASE MANAGEMENT/SOCIAL WORK - NSDCPEFALRISK_GEN_ALL_CORE
For information on Fall & Injury Prevention, visit: https://www.[facility]/news/fall-prevention-protects-and-maintains-health-and-mobility OR  https://www.[facility]/news/fall-prevention-tips-to-avoid-injury OR  [email or web address]://www.cdc.gov/steadi/patient.html For information on Fall & Injury Prevention, visit: https://www.[facility]/news/fall-prevention-protects-and-maintains-health-and-mobility OR  https://www.[facility]/news/fall-prevention-tips-to-avoid-injury OR  [email or web address]://www.cdc.gov/steadi/patient.html For information on Fall & Injury Prevention, visit: https://www.[facility]/news/fall-prevention-protects-and-maintains-health-and-mobility OR  https://www.[facility]/news/fall-prevention-tips-[email or web address]-injury OR  [email or web address].gov/steadi/[email or web address]

## 2023-08-17 LAB
CULTURE RESULTS: SIGNIFICANT CHANGE UP
SPECIMEN SOURCE: SIGNIFICANT CHANGE UP

## 2024-08-08 ENCOUNTER — NON-APPOINTMENT (OUTPATIENT)
Age: 39
End: 2024-08-08